# Patient Record
Sex: MALE | Race: BLACK OR AFRICAN AMERICAN | NOT HISPANIC OR LATINO
[De-identification: names, ages, dates, MRNs, and addresses within clinical notes are randomized per-mention and may not be internally consistent; named-entity substitution may affect disease eponyms.]

---

## 2020-04-06 ENCOUNTER — APPOINTMENT (OUTPATIENT)
Dept: DISASTER EMERGENCY | Facility: CLINIC | Age: 30
End: 2020-04-06
Payer: COMMERCIAL

## 2020-04-06 VITALS
WEIGHT: 188 LBS | BODY MASS INDEX: 28.49 KG/M2 | HEART RATE: 58 BPM | RESPIRATION RATE: 16 BRPM | SYSTOLIC BLOOD PRESSURE: 112 MMHG | DIASTOLIC BLOOD PRESSURE: 70 MMHG | OXYGEN SATURATION: 98 % | TEMPERATURE: 98.7 F | HEIGHT: 68 IN

## 2020-04-06 DIAGNOSIS — Z20.828 CONTACT WITH AND (SUSPECTED) EXPOSURE TO OTHER VIRAL COMMUNICABLE DISEASES: ICD-10-CM

## 2020-04-06 PROBLEM — Z00.00 ENCOUNTER FOR PREVENTIVE HEALTH EXAMINATION: Status: ACTIVE | Noted: 2020-04-06

## 2020-04-06 LAB — SARS-COV-2 N GENE NPH QL NAA+PROBE: DETECTED

## 2020-04-06 PROCEDURE — 99202 OFFICE O/P NEW SF 15 MIN: CPT

## 2020-04-06 NOTE — HISTORY OF PRESENT ILLNESS
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 Patient told to seek emergent care if symptoms occur. [FreeTextEntry1] : 28 y/o male he is [employer]. Reports sinus congestion, headache and chest tightness. Denies fever, chill, diarrhea, aches or pains. No chest pain or SOB. [TextBox_66] : N/A

## 2023-04-02 ENCOUNTER — EMERGENCY (EMERGENCY)
Facility: HOSPITAL | Age: 33
LOS: 1 days | Discharge: HOME | End: 2023-04-02
Admitting: EMERGENCY MEDICINE
Payer: COMMERCIAL

## 2023-04-02 VITALS
DIASTOLIC BLOOD PRESSURE: 88 MMHG | TEMPERATURE: 98 F | OXYGEN SATURATION: 100 % | HEART RATE: 55 BPM | SYSTOLIC BLOOD PRESSURE: 127 MMHG | RESPIRATION RATE: 16 BRPM

## 2023-04-02 LAB — CK SERPL-CCNC: 214 U/L — HIGH (ref 30–200)

## 2023-04-02 PROCEDURE — 93971 EXTREMITY STUDY: CPT | Mod: 26,LT

## 2023-04-02 PROCEDURE — 99282 EMERGENCY DEPT VISIT SF MDM: CPT

## 2023-04-02 RX ORDER — IBUPROFEN 200 MG
600 TABLET ORAL ONCE
Refills: 0 | Status: COMPLETED | OUTPATIENT
Start: 2023-04-02 | End: 2023-04-02

## 2023-04-02 RX ADMIN — Medication 600 MILLIGRAM(S): at 17:08

## 2023-04-02 NOTE — ED PROVIDER NOTE - CLINICAL SUMMARY MEDICAL DECISION MAKING FREE TEXT BOX
32-year-old male with no past medical history presents to ED complaining of left calf pain for 2 days.  Patient states 3 days ago he worked out his legs and calves. no dvt risk factors. likely calf strain, plan to check CK r/o rhabdo, US r/o dvt, NSAIDs. recommended rest, and ortho outpatient follow up.

## 2023-04-02 NOTE — ED PROVIDER NOTE - MUSCULOSKELETAL, MLM
Spine appears normal, range of motion is not limited. +L calf ttp. no edema. achilles intact. negative arias test.  dp pulse 2+ full rom

## 2023-04-02 NOTE — ED ADULT NURSE NOTE - OBJECTIVE STATEMENT
Patient is a&ox4 ambulatory at baseline, complaining of leg calf pain x3 days. Patient denies chest pain sob n/.v

## 2023-04-02 NOTE — ED ADULT TRIAGE NOTE - CHIEF COMPLAINT QUOTE
Pt c/o 2 days of left calf pain, states he was working out and developed the pain after. Denies swelling.

## 2023-04-02 NOTE — ED PROVIDER NOTE - NSFOLLOWUPINSTRUCTIONS_ED_ALL_ED_FT
Follow up with your primary care provider within 48 hours  Take Motrin 600mg every 8hrs with food for pain.  Rest, warm compresses    Follow up with Orthopedics     Worsening, continued or new concerning symptoms return to the emergency department.

## 2023-04-02 NOTE — ED PROVIDER NOTE - OBJECTIVE STATEMENT
32-year-old male with no past medical history presents to ED complaining of left calf pain for 2 days.  Patient states 3 days ago he worked out his legs and calves.  Patient performed squats, dead lifts and calf raises.  Patient states his left calf is now painful and feels more than sore.  Patient did not take any pain medication.  Patient has been icing his calf.  No recent travel, lower extremity, nausea, history of DVT or PE.  No chest pain or shortness of breath.  Pain is worse with movement and ambulation.  Patient denies hearing any pop.

## 2023-04-02 NOTE — ED PROVIDER NOTE - PATIENT PORTAL LINK FT
You can access the FollowMyHealth Patient Portal offered by [facility] by registering at the following website: [email or web address]. By joining [employer]’s FollowMyHealth portal, you will also be able to view your health information using other applications (apps) compatible with our system.

## 2024-08-24 ENCOUNTER — NON-APPOINTMENT (OUTPATIENT)
Age: 34
End: 2024-08-24